# Patient Record
Sex: FEMALE | Race: WHITE | URBAN - METROPOLITAN AREA
[De-identification: names, ages, dates, MRNs, and addresses within clinical notes are randomized per-mention and may not be internally consistent; named-entity substitution may affect disease eponyms.]

---

## 2021-08-02 LAB
AVERAGE GLUCOSE: NORMAL
HBA1C MFR BLD: 7.5 %

## 2021-12-16 ENCOUNTER — TELEPHONE (OUTPATIENT)
Dept: PHARMACY | Facility: CLINIC | Age: 71
End: 2021-12-16

## 2021-12-16 NOTE — TELEPHONE ENCOUNTER
[facility] CLINICAL PHARMACY REVIEW: ADHERENCE REVIEW  Identified care gap per [employer]; fills at [facility]: ACE/ARB, Diabetes and Statin adherence    Last Visit: unknown - PCP is an affiliate provider    Patient found in Outcomes [facility] and is not currently eligible for CMR/TIP    ASSESSMENT  ACE/ARB ADHERENCE    Per Insurance Records through 12/4/21 (2020 [place] = 97%; YTD [place] = 89%; Passed in 2021):   Losartan/HCTZ last filled on 12/3/21 for 30 day supply. Next refill due: 1/2/22    DIABETES ADHERENCE    Per Insurance Records through 12/4/21 (2020 [place] = 100%; YTD [place] = 99%; Passed in 2021):   Glipizide last filled on 12/3/21 for 30 day supply. Next refill due: 1/9/22    Lab Results   Component Value Date    LABA1C 7.5 08/02/2021      NOTE A1c <9%    STATIN ADHERENCE    Per Insurance Records through 12/4/21 (2020 [place] = 96%; YTD [place] = 79%; Potential Fail Date: 12/18/21): Atorvastatin last filled on 9/17/21 for 90 day supply. Next refill due: 12/16/21    Per [facility] DR BEENA GOTTI:   Atorvastatin last picked up on 9/17/21 for 90 day supply. There are refills remaining. Pharmacy staff member will process a refill for the patient today. Billed with [employer]AN  The following are interventions that have been identified:   - Patient now due to refill atorvastatin. Pharmacy will have this ready for pick-up later today. Attempting to reach patient to review.  Left message asking for return call. Will attempt to contact the patient again and discuss above. No future appointments.     Clarissa Santos, [facility], [place] // Department, toll free [phone], option 1

## 2021-12-17 NOTE — TELEPHONE ENCOUNTER
Called and spoke with the patient - states that her PCP took her off atorvastatin because she was experiencing some aches and pains. Patient states that her symptoms have improved a little since she stopped the medication last week. Reviewed risk of heart disease/stroke in patients with DM and sometimes its better to be on any dose of statin vs no statin at all. Patient declined resuming a statin (even at a lower dose) at this time. Thanked patient for her time. Will sign off.      Frances Johns, PharmD, [place] // Department, toll free [phone], option 1      For Pharmacy [id number] [place] [address] in place:  No   Recommendation Provided To: Patient/Caregiver: 1 via Telephone and Pharmacy: 1   Intervention Detail: Adherence Monitorin   Gap Closed?: No    Intervention Accepted By: Patient/Caregiver: 0 and Pharmacy: 1   Time Spent (min): 15

## 2022-03-29 ENCOUNTER — TELEPHONE (OUTPATIENT)
Dept: PHARMACY | Facility: CLINIC | Age: 72
End: 2022-03-29

## 2022-03-29 NOTE — TELEPHONE ENCOUNTER
[facility] CLINICAL PHARMACY: STATIN THERAPY REVIEW  Identified statin use in persons with diabetes care gap per [employer]. Records dated: 3/14/22. Last Office Visit: 1/17/22 per [facility]  - PCP: Wendi Roman (appears to be affiliate provider)    Patient not found in [facility]    [address] Records claims through 3/14/22: no claims YTD    Per Reconciled Dispense Report and [employer] Portal: losartan-HCTZ 100-25, #90/90ds LF 12/16/21, 3 refills remain (appears mostly timely refills May-Dec)     [address] [facility],[address] Records claims through 3/14/22 (2021 [place] = 99%; YTD [place] = 100%; Potential Fail Date: 7/30/22):   Glipizide 10mg last filled on 2/19/22 for 90 day supply. Next refill due: 5/20/22    Per [employer] Portal and Reconcile Dispense - also appears to fill Trulicity and metformin; each appear to have mostly timely refills and refills remaining    Lab Results   Component Value Date    LABA1C 7.5 08/02/2021     STATIN GAP IDENTIFIED    Per chart review  - Previously prescribed atorvastatin 10mg daily (last filled 90-ds 9/17/21)  - 12/16/21 encounter: \"patient - states that her PCP took her off atorvastatin because she was experiencing some aches and pains. Patient states that her symptoms have improved a little since she stopped the medication last week. \"    No results found for: CHOL, TRIG, HDL, LDLCHOLESTEROL, LDLCALC, LDLDIRECT   *Per [employer] portal, LDL 12/1/21 35    No results found for: ALT, AST   *per [employer] portal, ALT/AST 12/1/21 17/18    The ASCVD Risk score (Belem Molina., et al., 2013) failed to calculate for the following reasons: The systolic blood pressure is missing    Cannot find a previous HDL lab    Cannot find a previous total cholesterol lab     Hyperlipidemia Goal: could likely benefit from statin therapy given age, DM, HTN. Intolerance (\"aches and pains\") to atorvastatin 10mg daily.     PLAN  - Consider rosuvastatin 5mg daily (or even alternate day dosing)  - Labs/follow-up per provider discretion  - Alternatively, may continue to monitor and consider CMS allowable diagnosis code to close payor-identified statin gap    See fax/letter to external/affiliate PCP.     Felisa Catherine, PharmD, [facility]  Department, toll free: [phone], option 1     =======================================================   For Pharmacy [address] in place:  No   Recommendation Provided To: Provider: 1 via Fax sent to office   Gap Closed?: No    Intervention Accepted By: Provider: 0   Time Spent (min): 15

## 2023-08-28 ENCOUNTER — NURSE ONLY (OUTPATIENT)
Age: 73
End: 2023-08-28

## 2023-08-28 LAB
ALBUMIN SERPL BCG-MCNC: 4 G/DL (ref 3.5–5.1)
ALP SERPL-CCNC: 69 U/L (ref 38–126)
ALT SERPL W/O P-5'-P-CCNC: 51 U/L (ref 11–66)
ANION GAP SERPL CALC-SCNC: 11 MEQ/L (ref 8–16)
AST SERPL-CCNC: 72 U/L (ref 5–40)
BILIRUB SERPL-MCNC: 0.3 MG/DL (ref 0.3–1.2)
BUN SERPL-MCNC: 19 MG/DL (ref 7–22)
CALCIUM SERPL-MCNC: 9.6 MG/DL (ref 8.5–10.5)
CHLORIDE SERPL-SCNC: 103 MEQ/L (ref 98–111)
CHOLEST SERPL-MCNC: 177 MG/DL (ref 100–199)
CO2 SERPL-SCNC: 27 MEQ/L (ref 23–33)
CREAT SERPL-MCNC: 0.7 MG/DL (ref 0.4–1.2)
DEPRECATED MEAN GLUCOSE BLD GHB EST-ACNC: 168 MG/DL (ref 70–126)
GFR SERPL CREATININE-BSD FRML MDRD: > 60 ML/MIN/1.73M2
GLUCOSE SERPL-MCNC: 193 MG/DL (ref 70–108)
HBA1C MFR BLD HPLC: 7.6 % (ref 4.4–6.4)
HDLC SERPL-MCNC: 43 MG/DL
LDLC SERPL CALC-MCNC: 77 MG/DL
MAGNESIUM SERPL-MCNC: 1.8 MG/DL (ref 1.6–2.4)
POTASSIUM SERPL-SCNC: 4.4 MEQ/L (ref 3.5–5.2)
PROT SERPL-MCNC: 7 G/DL (ref 6.1–8)
SODIUM SERPL-SCNC: 141 MEQ/L (ref 135–145)
TRIGL SERPL-MCNC: 286 MG/DL (ref 0–199)
VIT B12 SERPL-MCNC: 878 PG/ML (ref 211–911)

## 2023-08-29 LAB
CREAT UR-MCNC: 55 MG/DL
MICROALBUMIN UR-MCNC: < 1.2 MG/DL
MICROALBUMIN/CREAT RATIO PNL UR: 22 MG/G (ref 0–30)

## 2024-01-08 ENCOUNTER — NURSE ONLY (OUTPATIENT)
Age: 74
End: 2024-01-08

## 2024-01-08 LAB
ALBUMIN SERPL BCG-MCNC: 4.1 G/DL (ref 3.5–5.1)
ALP SERPL-CCNC: 69 U/L (ref 38–126)
ALT SERPL W/O P-5'-P-CCNC: 28 U/L (ref 11–66)
ANION GAP SERPL CALC-SCNC: 12 MEQ/L (ref 8–16)
AST SERPL-CCNC: 36 U/L (ref 5–40)
BILIRUB SERPL-MCNC: 0.3 MG/DL (ref 0.3–1.2)
BUN SERPL-MCNC: 15 MG/DL (ref 7–22)
CALCIUM SERPL-MCNC: 9.8 MG/DL (ref 8.5–10.5)
CHLORIDE SERPL-SCNC: 104 MEQ/L (ref 98–111)
CHOLEST SERPL-MCNC: 167 MG/DL (ref 100–199)
CO2 SERPL-SCNC: 27 MEQ/L (ref 23–33)
CREAT SERPL-MCNC: 0.6 MG/DL (ref 0.4–1.2)
DEPRECATED MEAN GLUCOSE BLD GHB EST-ACNC: 150 MG/DL (ref 70–126)
GFR SERPL CREATININE-BSD FRML MDRD: > 60 ML/MIN/1.73M2
GLUCOSE SERPL-MCNC: 164 MG/DL (ref 70–108)
HBA1C MFR BLD HPLC: 7 % (ref 4.4–6.4)
HDLC SERPL-MCNC: 40 MG/DL
LDLC SERPL CALC-MCNC: 70 MG/DL
MAGNESIUM SERPL-MCNC: 1.8 MG/DL (ref 1.6–2.4)
POTASSIUM SERPL-SCNC: 4.3 MEQ/L (ref 3.5–5.2)
PROT SERPL-MCNC: 7 G/DL (ref 6.1–8)
SODIUM SERPL-SCNC: 143 MEQ/L (ref 135–145)
TRIGL SERPL-MCNC: 286 MG/DL (ref 0–199)
VIT B12 SERPL-MCNC: 906 PG/ML (ref 211–911)

## 2024-05-28 ENCOUNTER — NURSE ONLY (OUTPATIENT)
Age: 74
End: 2024-05-28

## 2024-05-28 LAB
ALBUMIN SERPL BCG-MCNC: 4 G/DL (ref 3.5–5.1)
ALP SERPL-CCNC: 68 U/L (ref 38–126)
ALT SERPL W/O P-5'-P-CCNC: 33 U/L (ref 11–66)
ANION GAP SERPL CALC-SCNC: 13 MEQ/L (ref 8–16)
AST SERPL-CCNC: 42 U/L (ref 5–40)
BILIRUB SERPL-MCNC: 0.2 MG/DL (ref 0.3–1.2)
BUN SERPL-MCNC: 19 MG/DL (ref 7–22)
CALCIUM SERPL-MCNC: 9.7 MG/DL (ref 8.5–10.5)
CHLORIDE SERPL-SCNC: 106 MEQ/L (ref 98–111)
CHOLEST SERPL-MCNC: 168 MG/DL (ref 100–199)
CO2 SERPL-SCNC: 25 MEQ/L (ref 23–33)
CREAT SERPL-MCNC: 0.7 MG/DL (ref 0.4–1.2)
DEPRECATED MEAN GLUCOSE BLD GHB EST-ACNC: 141 MG/DL (ref 70–126)
GFR SERPL CREATININE-BSD FRML MDRD: 90 ML/MIN/1.73M2
GLUCOSE SERPL-MCNC: 112 MG/DL (ref 70–108)
HBA1C MFR BLD HPLC: 6.7 % (ref 4.4–6.4)
HDLC SERPL-MCNC: 45 MG/DL
LDLC SERPL CALC-MCNC: 78 MG/DL
MAGNESIUM SERPL-MCNC: 1.9 MG/DL (ref 1.6–2.4)
POTASSIUM SERPL-SCNC: 4.7 MEQ/L (ref 3.5–5.2)
PROT SERPL-MCNC: 6.9 G/DL (ref 6.1–8)
SODIUM SERPL-SCNC: 144 MEQ/L (ref 135–145)
TRIGL SERPL-MCNC: 224 MG/DL (ref 0–199)
VIT B12 SERPL-MCNC: 1171 PG/ML (ref 211–911)

## 2024-08-30 ENCOUNTER — LAB (OUTPATIENT)
Age: 74
End: 2024-08-30

## 2024-08-30 LAB
BACTERIA: ABNORMAL
BILIRUB UR QL STRIP: NEGATIVE
CASTS #/AREA URNS LPF: ABNORMAL /LPF
CASTS #/AREA URNS LPF: ABNORMAL /LPF
CHARACTER UR: CLEAR
CHARCOAL URNS QL MICRO: ABNORMAL
COLOR UR: YELLOW
CRYSTALS URNS QL MICRO: ABNORMAL
EPITHELIAL CELLS, UA: ABNORMAL /HPF
GLUCOSE UR QL STRIP.AUTO: NEGATIVE MG/DL
HGB UR QL STRIP.AUTO: NEGATIVE
KETONES UR QL STRIP.AUTO: NEGATIVE
LEUKOCYTE ESTERASE UR QL STRIP.AUTO: ABNORMAL
NITRITE UR QL STRIP.AUTO: NEGATIVE
PH UR STRIP.AUTO: 6 [PH] (ref 5–9)
PROT UR STRIP.AUTO-MCNC: NEGATIVE MG/DL
RBC #/AREA URNS HPF: ABNORMAL /HPF
RENAL EPI CELLS #/AREA URNS HPF: ABNORMAL /[HPF]
SPECIFIC GRAVITY UA: 1.02 (ref 1–1.03)
UROBILINOGEN, URINE: 1 EU/DL (ref 0–1)
WBC #/AREA URNS HPF: ABNORMAL /HPF
YEAST LIKE FUNGI URNS QL MICRO: ABNORMAL

## 2024-08-31 LAB
BACTERIA UR CULT: ABNORMAL
ORGANISM: ABNORMAL

## 2024-09-27 ENCOUNTER — LAB (OUTPATIENT)
Age: 74
End: 2024-09-27

## 2024-09-27 LAB
ALBUMIN SERPL BCG-MCNC: 3.7 G/DL (ref 3.5–5.1)
ALP SERPL-CCNC: 66 U/L (ref 38–126)
ALT SERPL W/O P-5'-P-CCNC: 27 U/L (ref 11–66)
ANION GAP SERPL CALC-SCNC: 9 MEQ/L (ref 8–16)
AST SERPL-CCNC: 29 U/L (ref 5–40)
BASOPHILS ABSOLUTE: 0 THOU/MM3 (ref 0–0.1)
BASOPHILS NFR BLD AUTO: 0.4 %
BILIRUB SERPL-MCNC: 0.2 MG/DL (ref 0.3–1.2)
BUN SERPL-MCNC: 19 MG/DL (ref 7–22)
CALCIUM SERPL-MCNC: 9.2 MG/DL (ref 8.5–10.5)
CHLORIDE SERPL-SCNC: 104 MEQ/L (ref 98–111)
CHOLEST SERPL-MCNC: 185 MG/DL (ref 100–199)
CO2 SERPL-SCNC: 28 MEQ/L (ref 23–33)
CREAT SERPL-MCNC: 0.6 MG/DL (ref 0.4–1.2)
CREAT UR-MCNC: 128.3 MG/DL
DEPRECATED MEAN GLUCOSE BLD GHB EST-ACNC: 156 MG/DL (ref 70–126)
DEPRECATED RDW RBC AUTO: 46.8 FL (ref 35–45)
EOSINOPHIL NFR BLD AUTO: 4.4 %
EOSINOPHILS ABSOLUTE: 0.2 THOU/MM3 (ref 0–0.4)
ERYTHROCYTE [DISTWIDTH] IN BLOOD BY AUTOMATED COUNT: 14.4 % (ref 11.5–14.5)
GFR SERPL CREATININE-BSD FRML MDRD: > 90 ML/MIN/1.73M2
GLUCOSE SERPL-MCNC: 140 MG/DL (ref 70–108)
HBA1C MFR BLD HPLC: 7.2 % (ref 4.4–6.4)
HCT VFR BLD AUTO: 38.6 % (ref 37–47)
HCV IGG SERPL QL IA: NEGATIVE
HDLC SERPL-MCNC: 43 MG/DL
HGB BLD-MCNC: 12.1 GM/DL (ref 12–16)
IMM GRANULOCYTES # BLD AUTO: 0.03 THOU/MM3 (ref 0–0.07)
IMM GRANULOCYTES NFR BLD AUTO: 0.6 %
LDLC SERPL CALC-MCNC: 86 MG/DL
LYMPHOCYTES ABSOLUTE: 1.3 THOU/MM3 (ref 1–4.8)
LYMPHOCYTES NFR BLD AUTO: 25.8 %
MAGNESIUM SERPL-MCNC: 1.9 MG/DL (ref 1.6–2.4)
MCH RBC QN AUTO: 28.2 PG (ref 26–33)
MCHC RBC AUTO-ENTMCNC: 31.3 GM/DL (ref 32.2–35.5)
MCV RBC AUTO: 90 FL (ref 81–99)
MICROALBUMIN UR-MCNC: < 1.2 MG/DL
MICROALBUMIN/CREAT RATIO PNL UR: 9 MG/G (ref 0–30)
MONOCYTES ABSOLUTE: 0.3 THOU/MM3 (ref 0.4–1.3)
MONOCYTES NFR BLD AUTO: 5.4 %
NEUTROPHILS ABSOLUTE: 3.3 THOU/MM3 (ref 1.8–7.7)
NEUTROPHILS NFR BLD AUTO: 63.4 %
NRBC BLD AUTO-RTO: 0 /100 WBC
PLATELET # BLD AUTO: 247 THOU/MM3 (ref 130–400)
PMV BLD AUTO: 10.9 FL (ref 9.4–12.4)
POTASSIUM SERPL-SCNC: 4.3 MEQ/L (ref 3.5–5.2)
PROT SERPL-MCNC: 6.4 G/DL (ref 6.1–8)
RBC # BLD AUTO: 4.29 MILL/MM3 (ref 4.2–5.4)
SODIUM SERPL-SCNC: 141 MEQ/L (ref 135–145)
TRIGL SERPL-MCNC: 278 MG/DL (ref 0–199)
VIT B12 SERPL-MCNC: 1052 PG/ML (ref 211–911)
WBC # BLD AUTO: 5.2 THOU/MM3 (ref 4.8–10.8)

## 2025-01-24 ENCOUNTER — LAB (OUTPATIENT)
Age: 75
End: 2025-01-24

## 2025-01-24 LAB
ALBUMIN SERPL BCG-MCNC: 4 G/DL (ref 3.5–5.1)
ALP SERPL-CCNC: 69 U/L (ref 38–126)
ALT SERPL W/O P-5'-P-CCNC: 26 U/L (ref 11–66)
ANION GAP SERPL CALC-SCNC: 13 MEQ/L (ref 8–16)
AST SERPL-CCNC: 31 U/L (ref 5–40)
BASOPHILS ABSOLUTE: 0 THOU/MM3 (ref 0–0.1)
BASOPHILS NFR BLD AUTO: 0.9 %
BILIRUB SERPL-MCNC: 0.3 MG/DL (ref 0.3–1.2)
BUN SERPL-MCNC: 27 MG/DL (ref 7–22)
CALCIUM SERPL-MCNC: 9.5 MG/DL (ref 8.5–10.5)
CHLORIDE SERPL-SCNC: 105 MEQ/L (ref 98–111)
CHOLEST SERPL-MCNC: 170 MG/DL (ref 100–199)
CO2 SERPL-SCNC: 26 MEQ/L (ref 23–33)
CREAT SERPL-MCNC: 0.7 MG/DL (ref 0.4–1.2)
DEPRECATED MEAN GLUCOSE BLD GHB EST-ACNC: 150 MG/DL (ref 70–126)
DEPRECATED RDW RBC AUTO: 44.4 FL (ref 35–45)
EOSINOPHIL NFR BLD AUTO: 2.7 %
EOSINOPHILS ABSOLUTE: 0.1 THOU/MM3 (ref 0–0.4)
ERYTHROCYTE [DISTWIDTH] IN BLOOD BY AUTOMATED COUNT: 13.4 % (ref 11.5–14.5)
GFR SERPL CREATININE-BSD FRML MDRD: 89 ML/MIN/1.73M2
GLUCOSE SERPL-MCNC: 122 MG/DL (ref 70–108)
HBA1C MFR BLD HPLC: 7 % (ref 4.4–6.4)
HCT VFR BLD AUTO: 39.9 % (ref 37–47)
HDLC SERPL-MCNC: 47 MG/DL
HGB BLD-MCNC: 12.6 GM/DL (ref 12–16)
IMM GRANULOCYTES # BLD AUTO: 0.02 THOU/MM3 (ref 0–0.07)
IMM GRANULOCYTES NFR BLD AUTO: 0.4 %
LDLC SERPL CALC-MCNC: 79 MG/DL
LYMPHOCYTES ABSOLUTE: 1.6 THOU/MM3 (ref 1–4.8)
LYMPHOCYTES NFR BLD AUTO: 28.7 %
MAGNESIUM SERPL-MCNC: 1.8 MG/DL (ref 1.6–2.4)
MCH RBC QN AUTO: 28.6 PG (ref 26–33)
MCHC RBC AUTO-ENTMCNC: 31.6 GM/DL (ref 32.2–35.5)
MCV RBC AUTO: 90.5 FL (ref 81–99)
MONOCYTES ABSOLUTE: 0.3 THOU/MM3 (ref 0.4–1.3)
MONOCYTES NFR BLD AUTO: 5.2 %
NEUTROPHILS ABSOLUTE: 3.4 THOU/MM3 (ref 1.8–7.7)
NEUTROPHILS NFR BLD AUTO: 62.1 %
NRBC BLD AUTO-RTO: 0 /100 WBC
PLATELET # BLD AUTO: 281 THOU/MM3 (ref 130–400)
PMV BLD AUTO: 11.4 FL (ref 9.4–12.4)
POTASSIUM SERPL-SCNC: 4 MEQ/L (ref 3.5–5.2)
PROT SERPL-MCNC: 6.8 G/DL (ref 6.1–8)
RBC # BLD AUTO: 4.41 MILL/MM3 (ref 4.2–5.4)
SODIUM SERPL-SCNC: 144 MEQ/L (ref 135–145)
TRIGL SERPL-MCNC: 222 MG/DL (ref 0–199)
VIT B12 SERPL-MCNC: 1043 PG/ML (ref 211–911)
WBC # BLD AUTO: 5.5 THOU/MM3 (ref 4.8–10.8)

## 2025-05-29 ENCOUNTER — LAB (OUTPATIENT)
Age: 75
End: 2025-05-29

## 2025-05-29 LAB
ALBUMIN SERPL BCG-MCNC: 3.8 G/DL (ref 3.4–4.9)
ALP SERPL-CCNC: 63 U/L (ref 38–126)
ALT SERPL W/O P-5'-P-CCNC: 22 U/L (ref 10–35)
ANION GAP SERPL CALC-SCNC: 10 MEQ/L (ref 8–16)
AST SERPL-CCNC: 27 U/L (ref 10–35)
BASOPHILS ABSOLUTE: 0 THOU/MM3 (ref 0–0.1)
BASOPHILS NFR BLD AUTO: 0.7 %
BILIRUB SERPL-MCNC: < 0.2 MG/DL (ref 0.3–1.2)
BUN SERPL-MCNC: 18 MG/DL (ref 8–23)
CALCIUM SERPL-MCNC: 9.2 MG/DL (ref 8.8–10.2)
CHLORIDE SERPL-SCNC: 107 MEQ/L (ref 98–111)
CHOLEST SERPL-MCNC: 140 MG/DL (ref 100–199)
CO2 SERPL-SCNC: 24 MEQ/L (ref 22–29)
CREAT SERPL-MCNC: 0.7 MG/DL (ref 0.5–0.9)
DEPRECATED MEAN GLUCOSE BLD GHB EST-ACNC: 153 MG/DL (ref 70–126)
DEPRECATED RDW RBC AUTO: 45.3 FL (ref 35–45)
EOSINOPHIL NFR BLD AUTO: 2.8 %
EOSINOPHILS ABSOLUTE: 0.1 THOU/MM3 (ref 0–0.4)
ERYTHROCYTE [DISTWIDTH] IN BLOOD BY AUTOMATED COUNT: 13.8 % (ref 11.5–14.5)
GFR SERPL CREATININE-BSD FRML MDRD: 89 ML/MIN/1.73M2
GLUCOSE SERPL-MCNC: 140 MG/DL (ref 74–109)
HBA1C MFR BLD HPLC: 7.1 % (ref 4–6)
HCT VFR BLD AUTO: 39.9 % (ref 37–47)
HDLC SERPL-MCNC: 46 MG/DL
HGB BLD-MCNC: 12.6 GM/DL (ref 12–16)
IMM GRANULOCYTES # BLD AUTO: 0.01 THOU/MM3 (ref 0–0.07)
IMM GRANULOCYTES NFR BLD AUTO: 0.2 %
LDLC SERPL CALC-MCNC: 63 MG/DL
LYMPHOCYTES ABSOLUTE: 1.2 THOU/MM3 (ref 1–4.8)
LYMPHOCYTES NFR BLD AUTO: 25.9 %
MAGNESIUM SERPL-MCNC: 1.9 MG/DL (ref 1.6–2.6)
MCH RBC QN AUTO: 28.4 PG (ref 26–33)
MCHC RBC AUTO-ENTMCNC: 31.6 GM/DL (ref 32.2–35.5)
MCV RBC AUTO: 89.9 FL (ref 81–99)
MONOCYTES ABSOLUTE: 0.3 THOU/MM3 (ref 0.4–1.3)
MONOCYTES NFR BLD AUTO: 5.9 %
NEUTROPHILS ABSOLUTE: 3 THOU/MM3 (ref 1.8–7.7)
NEUTROPHILS NFR BLD AUTO: 64.5 %
NRBC BLD AUTO-RTO: 0 /100 WBC
PLATELET # BLD AUTO: 269 THOU/MM3 (ref 130–400)
PMV BLD AUTO: 11.3 FL (ref 9.4–12.4)
POTASSIUM SERPL-SCNC: 4.4 MEQ/L (ref 3.5–5.2)
PROT SERPL-MCNC: 6.4 G/DL (ref 6.4–8.3)
RBC # BLD AUTO: 4.44 MILL/MM3 (ref 4.2–5.4)
SODIUM SERPL-SCNC: 141 MEQ/L (ref 135–145)
TRIGL SERPL-MCNC: 157 MG/DL (ref 0–199)
VIT B12 SERPL-MCNC: 1144 PG/ML (ref 232–1245)
WBC # BLD AUTO: 4.6 THOU/MM3 (ref 4.8–10.8)

## 2025-06-27 ENCOUNTER — LAB (OUTPATIENT)
Age: 75
End: 2025-06-27